# Patient Record
Sex: FEMALE | Race: WHITE | URBAN - METROPOLITAN AREA
[De-identification: names, ages, dates, MRNs, and addresses within clinical notes are randomized per-mention and may not be internally consistent; named-entity substitution may affect disease eponyms.]

---

## 2017-01-10 ENCOUNTER — TELEPHONE (OUTPATIENT)
Dept: CARDIOLOGY CLINIC | Age: 62
End: 2017-01-10

## 2017-01-13 ENCOUNTER — HOSPITAL ENCOUNTER (OUTPATIENT)
Dept: OTHER | Age: 62
Discharge: HOME | End: 2017-01-13
Attending: NURSE PRACTITIONER | Admitting: NURSE PRACTITIONER

## 2017-10-05 ENCOUNTER — OFFICE VISIT (OUTPATIENT)
Dept: CARDIOLOGY CLINIC | Age: 62
End: 2017-10-05

## 2017-10-05 VITALS
HEIGHT: 60 IN | DIASTOLIC BLOOD PRESSURE: 80 MMHG | SYSTOLIC BLOOD PRESSURE: 138 MMHG | BODY MASS INDEX: 40.44 KG/M2 | WEIGHT: 206 LBS | HEART RATE: 80 BPM

## 2017-10-05 DIAGNOSIS — I10 ESSENTIAL HYPERTENSION, BENIGN: ICD-10-CM

## 2017-10-05 DIAGNOSIS — E78.5 OTHER AND UNSPECIFIED HYPERLIPIDEMIA: ICD-10-CM

## 2017-10-05 DIAGNOSIS — I25.119 ATHEROSCLEROSIS OF NATIVE CORONARY ARTERY OF NATIVE HEART WITH ANGINA PECTORIS (HCC): Primary | ICD-10-CM

## 2017-10-05 PROCEDURE — 3017F COLORECTAL CA SCREEN DOC REV: CPT | Performed by: INTERNAL MEDICINE

## 2017-10-05 PROCEDURE — G8427 DOCREV CUR MEDS BY ELIG CLIN: HCPCS | Performed by: INTERNAL MEDICINE

## 2017-10-05 PROCEDURE — G8484 FLU IMMUNIZE NO ADMIN: HCPCS | Performed by: INTERNAL MEDICINE

## 2017-10-05 PROCEDURE — 99214 OFFICE O/P EST MOD 30 MIN: CPT | Performed by: INTERNAL MEDICINE

## 2017-10-05 PROCEDURE — G8598 ASA/ANTIPLAT THER USED: HCPCS | Performed by: INTERNAL MEDICINE

## 2017-10-05 PROCEDURE — G8417 CALC BMI ABV UP PARAM F/U: HCPCS | Performed by: INTERNAL MEDICINE

## 2017-10-05 PROCEDURE — 3014F SCREEN MAMMO DOC REV: CPT | Performed by: INTERNAL MEDICINE

## 2017-10-05 PROCEDURE — 93000 ELECTROCARDIOGRAM COMPLETE: CPT | Performed by: INTERNAL MEDICINE

## 2017-10-05 PROCEDURE — 1036F TOBACCO NON-USER: CPT | Performed by: INTERNAL MEDICINE

## 2017-10-05 RX ORDER — AMLODIPINE BESYLATE 5 MG/1
5 TABLET ORAL DAILY
COMMUNITY
End: 2017-10-25 | Stop reason: SDUPTHER

## 2017-10-05 RX ORDER — FAMOTIDINE 20 MG/1
20 TABLET, FILM COATED ORAL 2 TIMES DAILY
COMMUNITY
End: 2018-09-26

## 2017-10-05 RX ORDER — VALSARTAN 40 MG/1
40 TABLET ORAL 2 TIMES DAILY
COMMUNITY
End: 2017-10-25 | Stop reason: SDUPTHER

## 2017-10-05 NOTE — MR AVS SNAPSHOT
nitroGLYCERIN (NITROLINGUAL) 0.4 MG/SPRAY spray Place 1 spray under the tongue every 5 minutes as needed      Allergies              Codeine     Penicillins     Sulfa Antibiotics     Versed [Midazolam]     Pt states it makes her \"evil\"      We Ordered/Performed the following           EKG 12 lead unit performed     Comments: This order was created through External Result Entry    EKG 12 Lead          Additional Information        Basic Information     Date Of Birth Sex Race Ethnicity Preferred Language    1955 Female White Non-/Non  English      Problem List as of 10/5/2017  Date Reviewed: 10/5/2017                Fatigue    PVD (peripheral vascular disease) (HCC)    Chest pain    Other and unspecified hyperlipidemia    Essential hypertension, benign    Coronary atherosclerosis of native coronary artery      Preventive Care        Date Due    Hepatitis C screening is recommended for all adults regardless of risk factors born between [facility] at least once (lifetime) who have never been tested. 1955    HIV screening is recommended for all people regardless of risk factors  aged 15-65 years at least once (lifetime) who have never been HIV tested. 2/9/1971    Tetanus Combination Vaccine (1 - Tdap) 2/9/1975    Pap Smear 2/9/1977    Diabetes Screening 2/9/1996    Mammograms are recommended every 2 years for low/average risk patients aged 48 - 69, and every year for high risk patients per updated national guidelines. However these guidelines can be individualized by your provider. 2/9/2006    Colonoscopy 2/9/2006    Zoster Vaccine 2/9/2016    Yearly Flu Vaccine (1) 9/1/2017    Cholesterol Screening 8/27/2020            MyChart Signup           Our records indicate that you have declined MyChart signup.

## 2017-10-05 NOTE — LETTER
 valsartan (DIOVAN) 40 MG tablet Take 40 mg by mouth 2 times daily      furosemide (LASIX) 40 MG tablet Take 1 tablet by mouth daily (Patient taking differently: Take 40 mg by mouth every other day ) 90 tablet 3    clopidogrel (PLAVIX) 75 MG tablet Take 1 tablet by mouth daily 90 tablet 3    Budesonide-Formoterol Fumarate (SYMBICORT IN) Inhale  into the lungs.  OXYGEN 2 L by Nasal route nightly.  aspirin 81 MG EC tablet Take 81 mg by mouth daily.  nitroGLYCERIN (NITROLINGUAL) 0.4 MG/SPRAY spray Place 1 spray under the tongue every 5 minutes as needed 1 Bottle 2     No current facility-administered medications for this visit. Social History:  Social History     Social History    Marital status:      Spouse name: N/A    Number of children: N/A    Years of education: N/A     Occupational History    Not on file. Social History Main Topics    Smoking status: Former Smoker     Packs/day: 0.25     Years: 20.00    Smokeless tobacco: Never Used    Alcohol use No    Drug use: No    Sexual activity: Not on file     Other Topics Concern    Not on file     Social History Narrative       Family History:  family history includes Coronary Art Dis in her father; Heart Disease in her father and mother; Devoria Yovanny in her mother. Allergies:  Codeine; Penicillins; Sulfa antibiotics; and Versed [midazolam]     Review of Systems:   · Constitutional: there has been no unanticipated weight loss. No change in energy or activity level   · Eyes: No visual changes   · ENT: No Headaches, hearing loss or vertigo. No mouth sores or sore throat. · Cardiovascular: Reviewed in HPI  · Respiratory: No cough or wheezing, no sputum production. · Gastrointestinal: No abdominal pain, appetite loss, blood in stools. No change in bowel or bladder habits. · Genitourinary: No nocturia, dysuria, trouble voiding  · Musculoskeletal:  No gait disturbance, weakness or joint complaints. after the visit showed that she had 2 caths in [place], the first with a stent placed in the LAD and a repeat cath 3 weeks later that showed the stent was patent. Review of her previous records show that she has had about 15 cath procedures and has several stents. RCA, OM LAD   2. Essential hypertension, benign -stable   3. Other and unspecified hyperlipidemia -intolerant to lipitor, Crestor, simvastatin. Was on repatha but funds ran out   4. Tobacco use -discussed at length. States she will buy patches today  5. Peripheral vascular disease - cant take statins and continues to smoke. Sp Aortobifem. 2 week FU. Hopefully she will stop smoking soon. Possibly she will qualify for one of the new lipid lowering agents. Thank you for allowing to me to participate in the care of Michael Gracia. If you have questions, please do not hesitate to call me. I look forward to following [facility] along with you.     Sincerely,        Amish Abdi MD

## 2017-10-05 NOTE — PROGRESS NOTES
tongue every 5 minutes as needed 1 Bottle 2     No current facility-administered medications for this visit. Social History:  Social History     Social History    Marital status:      Spouse name: N/A    Number of children: N/A    Years of education: N/A     Occupational History    Not on file. Social History Main Topics    Smoking status: Former Smoker     Packs/day: 0.25     Years: 20.00    Smokeless tobacco: Never Used    Alcohol use No    Drug use: No    Sexual activity: Not on file     Other Topics Concern    Not on file     Social History Narrative       Family History:  family history includes Coronary Art Dis in her father; Heart Disease in her father and mother; Daja Acosta in her mother. Allergies:  Codeine; Penicillins; Sulfa antibiotics; and Versed [midazolam]     Review of Systems:   · Constitutional: there has been no unanticipated weight loss. No change in energy or activity level   · Eyes: No visual changes   · ENT: No Headaches, hearing loss or vertigo. No mouth sores or sore throat. · Cardiovascular: Reviewed in HPI  · Respiratory: No cough or wheezing, no sputum production. · Gastrointestinal: No abdominal pain, appetite loss, blood in stools. No change in bowel or bladder habits. · Genitourinary: No nocturia, dysuria, trouble voiding  · Musculoskeletal:  No gait disturbance, weakness or joint complaints. · Integumentary: No rash or pruritis. · Neurological: No headache, change in muscle strength, numbness or tingling. No change in gait, balance, coordination, mood, affect, memory, mentation, behavior. · Psychiatric: No anxiety or depression  · Endocrine: No malaise or fever  · Hematologic/Lymphatic: No abnormal bruising or bleeding, blood clots or swollen lymph nodes. · Allergic/Immunologic: No nasal congestion or hives.     Physical Examination:    Vitals:    10/05/17 1208   BP: 138/80   Site: Right Arm   Position: Sitting   Cuff Size: Large Adult   Pulse: 80   Weight: 206 lb (93.4 kg)   Height: 5' (1.524 m)     Body mass index is 40.23 kg/(m^2). Wt Readings from Last 3 Encounters:   10/05/17 206 lb (93.4 kg)   11/16/16 201 lb (91.2 kg)   11/18/15 192 lb (87.1 kg)      BP Readings from Last 3 Encounters:   10/05/17 138/80   11/16/16 (!) 160/92   11/18/15 130/80      Constitutional and General Appearance:  appears stated age  Eyes - no xanthelasma  Respiratory:  · Normal excursion and expansion without use of accessory muscles  · Resp Auscultation: Normal breath sounds without dullness  Cardiovascular:  · The apical impulses not displaced  · Heart is regular rate and rhythm with normal S1, S2, 2/6 REJI  · PMI is normal  · The carotid upstroke is normal, no bruit noted   · JVP is not elevated  · Peripheral pulses are symmetrical  · There is no clubbing, cyanosis of the extremities  · No edema  · No varicosities  · Femoral Arteries: 2+ and equal without bruits  · Pedal Pulses: 2+ and equal   Abdomen:  · No masses or tenderness  · Aorta not palpable  · Normal bowel sounds  Neurological/Psychiatric:  · Alert and oriented x3  · Moves all extremities well  · Exhibits normal gait balance and coordination      Assessment/Plan  1. Atherosclerosis of native coronary artery of native heart with angina pectoris ([facility] [place] [facility] [address] -recent stent placed in [place]. Records obtained after the visit showed that she had 2 caths in [place], the first with a stent placed in the LAD and a repeat cath 3 weeks later that showed the stent was patent. Review of her previous records show that she has had about 15 cath procedures and has several stents. RCA, OM LAD   2. Essential hypertension, benign -stable   3. Other and unspecified hyperlipidemia -intolerant to lipitor, Crestor, simvastatin. Was on repatha but funds ran out   4. Tobacco use -discussed at length. States she will buy patches today  5. Peripheral vascular disease - cant take statins and continues to smoke. Sp Aortobifem. 2 week FU. Hopefully she will stop smoking soon. Possibly she will qualify for one of the new lipid lowering agents. Thank you for allowing to me to participate in the care of Suzie Palma.

## 2017-10-25 ENCOUNTER — OFFICE VISIT (OUTPATIENT)
Dept: CARDIOLOGY CLINIC | Age: 62
End: 2017-10-25

## 2017-10-25 VITALS
HEIGHT: 60 IN | HEART RATE: 66 BPM | DIASTOLIC BLOOD PRESSURE: 100 MMHG | BODY MASS INDEX: 40.84 KG/M2 | SYSTOLIC BLOOD PRESSURE: 140 MMHG | WEIGHT: 208 LBS

## 2017-10-25 DIAGNOSIS — I10 ESSENTIAL HYPERTENSION, BENIGN: ICD-10-CM

## 2017-10-25 DIAGNOSIS — E78.2 MIXED HYPERLIPIDEMIA: ICD-10-CM

## 2017-10-25 DIAGNOSIS — I25.119 ATHEROSCLEROSIS OF NATIVE CORONARY ARTERY OF NATIVE HEART WITH ANGINA PECTORIS (HCC): Primary | ICD-10-CM

## 2017-10-25 PROCEDURE — G8427 DOCREV CUR MEDS BY ELIG CLIN: HCPCS | Performed by: NURSE PRACTITIONER

## 2017-10-25 PROCEDURE — G8598 ASA/ANTIPLAT THER USED: HCPCS | Performed by: NURSE PRACTITIONER

## 2017-10-25 PROCEDURE — G8484 FLU IMMUNIZE NO ADMIN: HCPCS | Performed by: NURSE PRACTITIONER

## 2017-10-25 PROCEDURE — 3017F COLORECTAL CA SCREEN DOC REV: CPT | Performed by: NURSE PRACTITIONER

## 2017-10-25 PROCEDURE — 3014F SCREEN MAMMO DOC REV: CPT | Performed by: NURSE PRACTITIONER

## 2017-10-25 PROCEDURE — 99214 OFFICE O/P EST MOD 30 MIN: CPT | Performed by: NURSE PRACTITIONER

## 2017-10-25 PROCEDURE — G8417 CALC BMI ABV UP PARAM F/U: HCPCS | Performed by: NURSE PRACTITIONER

## 2017-10-25 PROCEDURE — 1036F TOBACCO NON-USER: CPT | Performed by: NURSE PRACTITIONER

## 2017-10-25 RX ORDER — AMLODIPINE BESYLATE 5 MG/1
5 TABLET ORAL DAILY
Qty: 30 TABLET | Refills: 5 | Status: SHIPPED | OUTPATIENT
Start: 2017-10-25 | End: 2018-09-26

## 2017-10-25 RX ORDER — VALSARTAN 40 MG/1
40 TABLET ORAL 2 TIMES DAILY
Qty: 60 TABLET | Refills: 6 | Status: SHIPPED | OUTPATIENT
Start: 2017-10-25 | End: 2018-09-26

## 2017-10-25 RX ORDER — DOXAZOSIN 2 MG/1
2 TABLET ORAL NIGHTLY
Qty: 30 TABLET | Refills: 3 | Status: SHIPPED | OUTPATIENT
Start: 2017-10-25 | End: 2019-11-06 | Stop reason: ALTCHOICE

## 2017-10-25 RX ORDER — CLOPIDOGREL BISULFATE 75 MG/1
75 TABLET ORAL DAILY
Qty: 90 TABLET | Refills: 3 | Status: SHIPPED | OUTPATIENT
Start: 2017-10-25

## 2017-10-25 RX ORDER — FUROSEMIDE 40 MG/1
40 TABLET ORAL DAILY
Qty: 90 TABLET | Refills: 3 | Status: SHIPPED | OUTPATIENT
Start: 2017-10-25 | End: 2018-08-20 | Stop reason: DRUGHIGH

## 2017-10-25 NOTE — LETTER
 Number of children: N/A    Years of education: N/A     Occupational History    Not on file. Social History Main Topics    Smoking status: Former Smoker     Packs/day: 0.25     Years: 20.00    Smokeless tobacco: Never Used    Alcohol use No    Drug use: No    Sexual activity: Not on file     Other Topics Concern    Not on file     Social History Narrative    No narrative on file       Family History:  family history includes Coronary Art Dis in her father; Heart Disease in her father and mother; Sheldon Standing in her mother. Allergies:  Codeine; Penicillins; Sulfa antibiotics; and Versed [midazolam]     Review of Systems:   · Constitutional: there has been no unanticipated weight loss. · Eyes: No visual changes   · ENT: No Headaches, hearing loss or vertigo. No mouth sores or sore throat. · Cardiovascular: Reviewed in HPI  · Respiratory: No cough or wheezing, no sputum production. · Gastrointestinal: No abdominal pain, appetite loss, blood in stools. No change in bowel or bladder habits. · Genitourinary: No nocturia, dysuria, trouble voiding  · Musculoskeletal:  No gait disturbance, weakness or joint complaints. · Integumentary: No rash or pruritis. · Neurological: No headache, change in muscle strength, numbness or tingling. No change in gait, balance, coordination, mood, affect, memory, mentation, behavior. · Psychiatric: No anxiety or depression  · Endocrine: No malaise or fever  · Hematologic/Lymphatic: No abnormal bruising or bleeding, blood clots or swollen lymph nodes. · Allergic/Immunologic: No nasal congestion or hives. Physical Examination:    Vitals:    10/25/17 1049 10/25/17 1053   BP: (!) 140/100 (!) 140/100   Site: Left Arm    Position: Sitting    Cuff Size: Large Adult    Pulse: 66    Weight: 208 lb (94.3 kg)    Height: 5' (1.524 m)      Body mass index is 40.62 kg/m².      Wt Readings from Last 3 Encounters:   10/25/17 208 lb (94.3 kg)   10/05/17 206 lb (93.4 kg) 11/16/16 201 lb (91.2 kg)      BP Readings from Last 3 Encounters:   10/25/17 (!) 140/100   10/05/17 138/80   11/16/16 (!) 160/92      Constitutional and General Appearance:  appears stated age, has xanthomas, and xanthelasmas  Respiratory:  · Normal excursion and expansion without use of accessory muscles  · Resp Auscultation: Normal breath sounds without dullness  Cardiovascular:  · The apical impulses not displaced  · Heart is regular rate and rhythm with normal S1, S2, 1/6 soft murmur  · The carotid upstroke is normal, + bruit noted   · JVP is not elevated  · Peripheral pulses are symmetrical  · There is no clubbing, cyanosis of the extremities  · No edema  · Femoral Arteries: 2+ and equal  · Pedal Pulses: 2+ and equal   Abdomen:  · No masses or tenderness  · Normal bowel sounds  Neurological/Psychiatric:  · Alert and oriented x3  · Moves all extremities well  · Exhibits normal gait balance and coordination      Assessment/Plan  1. 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 Essential hypertension -   Previous RA stent 2003 by Dr. Jacek Roe at [facility]. only taking hydralazine once a day, needs to increase to BID  Renal arteries - patent stent in the left RA; 50% stenosis at the origin of the right renal artery. Plan: she can not tolerate bigger dose of clonidine  Summary 5/15      Grayscale imaging reveals normal-sized renal parenchyma and no significant   flow disturbance in the bilateral renal arteries. No hemodynamically significant stenosis in the visualized portion of the   renal arteries. Renal artery velocity, renal aortic ratio and resistive indices are noted to   be normal bilaterally.         A lot of her  meds were stopped while she was in the hospital

## 2017-10-25 NOTE — COMMUNICATION BODY
[address]   Cardiac Evaluation      Patient: Aniyah Salvador  YOB: 1955  Date: 10/25/17       Chief Complaint   Patient presents with    Coronary Artery Disease     Ocas fatigue    Hypertension    Hyperlipidemia        Referring provider: Zee Thompson MD    History of Present Illness:  Ms Namita Chiang is seen today for  follow up. She has hx of elevated lipids,  HTN, CAD. She moved to [place] for few months ago  and had stent in LAD then. After abnormal GXT, and also found to have diffuse small vessel disease. She also has PVD. Ifeanyi Doyle had a stent placed to the RCA and OM in 2003. Then, August, 2009 she had a repeat cath which revealed patent stents. She is taking multiple blood pressure medications. She has a left renal artery stent in 2003 with Dr Roslyn Anna. 3/14 had angiogram-no intervention. She has been adjusting her medications  She also has increased stress with her  that is ill. SHe was on juxtapid for awhile, but insurance won't pay for it and now on repatha and giving herself injections without any issues. She continues to smoke. Past Medical History:   has a past medical history of CAD (coronary artery disease); Hyperlipidemia; Hypertension; and PVD (peripheral vascular disease) ([address] Surgical History:   has a past surgical history that includes Coronary artery bypass graft; Hysterectomy; Abdominal aortic aneurysm repair (2007); and Cardiac catheterization. Social History:  Social History     Social History    Marital status:      Spouse name: N/A    Number of children: N/A    Years of education: N/A     Occupational History    Not on file.      Social History Main Topics    Smoking status: Former Smoker     Packs/day: 0.25     Years: 20.00    Smokeless tobacco: Never Used    Alcohol use No    Drug use: No    Sexual activity: Not on file     Other Topics Concern    Not on file     Social History Narrative    No narrative on file       Family History:  family history includes Coronary Art Dis in her father; Heart Disease in her father and mother; Reginold Kerr in her mother. Allergies:  Codeine; Penicillins; Sulfa antibiotics; and Versed [midazolam]     Review of Systems:   · Constitutional: there has been no unanticipated weight loss. · Eyes: No visual changes   · ENT: No Headaches, hearing loss or vertigo. No mouth sores or sore throat. · Cardiovascular: Reviewed in HPI  · Respiratory: No cough or wheezing, no sputum production. · Gastrointestinal: No abdominal pain, appetite loss, blood in stools. No change in bowel or bladder habits. · Genitourinary: No nocturia, dysuria, trouble voiding  · Musculoskeletal:  No gait disturbance, weakness or joint complaints. · Integumentary: No rash or pruritis. · Neurological: No headache, change in muscle strength, numbness or tingling. No change in gait, balance, coordination, mood, affect, memory, mentation, behavior. · Psychiatric: No anxiety or depression  · Endocrine: No malaise or fever  · Hematologic/Lymphatic: No abnormal bruising or bleeding, blood clots or swollen lymph nodes. · Allergic/Immunologic: No nasal congestion or hives. Physical Examination:    Vitals:    10/25/17 1049 10/25/17 1053   BP: (!) 140/100 (!) 140/100   Site: Left Arm    Position: Sitting    Cuff Size: Large Adult    Pulse: 66    Weight: 208 lb (94.3 kg)    Height: 5' (1.524 m)      Body mass index is 40.62 kg/m².      Wt Readings from Last 3 Encounters:   10/25/17 208 lb (94.3 kg)   10/05/17 206 lb (93.4 kg)   11/16/16 201 lb (91.2 kg)      BP Readings from Last 3 Encounters:   10/25/17 (!) 140/100   10/05/17 138/80   11/16/16 (!) 160/92      Constitutional and General Appearance:  appears stated age, has xanthomas, and xanthelasmas  Respiratory:  · Normal excursion and expansion without use of accessory muscles  · Resp Auscultation: Normal breath sounds without dullness  Cardiovascular:  · The apical impulses not displaced  · Heart is regular rate and rhythm with normal S1, S2, 1/6 soft murmur  · The carotid upstroke is normal, + bruit noted   · JVP is not elevated  · Peripheral pulses are symmetrical  · There is no clubbing, cyanosis of the extremities  · No edema  · Femoral Arteries: 2+ and equal  · Pedal Pulses: 2+ and equal   Abdomen:  · No masses or tenderness  · Normal bowel sounds  Neurological/Psychiatric:  · Alert and oriented x3  · Moves all extremities well  · Exhibits normal gait balance and coordination      Assessment/Plan  1. 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 Essential hypertension -   Previous RA stent 2003 by Dr. Christa Thomas at [facility]. only taking hydralazine once a day, needs to increase to BID  Renal arteries - patent stent in the left RA; 50% stenosis at the origin of the right renal artery. Plan: she can not tolerate bigger dose of clonidine  Summary 5/15      Grayscale imaging reveals normal-sized renal parenchyma and no significant   flow disturbance in the bilateral renal arteries. No hemodynamically significant stenosis in the visualized portion of the   renal arteries. Renal artery velocity, renal aortic ratio and resistive indices are noted to   be normal bilaterally. A lot of her  meds were stopped while she was in the hospital  Due to hypotension, BP at home has been in the 160s,  Cardura works for her and will restart at 1 mg a day     3.  Coronary atherosclerosis of native coronary artery -stable  PCI to RCA 7/17 after abnormal GXT  GXT 5/26/11: normal myocardial perfusion w/ EF 66%   Angiogram 4/3/08: normal LVEDP at 12mmHg, EF 55%, minimal coronary disease with 2 patent stents, one of which was in the RCA and the second in OM branch One is a

## 2017-10-25 NOTE — COMMUNICATION BODY
tongue every 5 minutes as needed 1 Bottle 2     No current facility-administered medications for this visit. Social History:  Social History     Social History    Marital status:      Spouse name: N/A    Number of children: N/A    Years of education: N/A     Occupational History    Not on file. Social History Main Topics    Smoking status: Former Smoker     Packs/day: 0.25     Years: 20.00    Smokeless tobacco: Never Used    Alcohol use No    Drug use: No    Sexual activity: Not on file     Other Topics Concern    Not on file     Social History Narrative       Family History:  family history includes Coronary Art Dis in her father; Heart Disease in her father and mother; Linharika Mendez in her mother. Allergies:  Codeine; Penicillins; Sulfa antibiotics; and Versed [midazolam]     Review of Systems:   · Constitutional: there has been no unanticipated weight loss. No change in energy or activity level   · Eyes: No visual changes   · ENT: No Headaches, hearing loss or vertigo. No mouth sores or sore throat. · Cardiovascular: Reviewed in HPI  · Respiratory: No cough or wheezing, no sputum production. · Gastrointestinal: No abdominal pain, appetite loss, blood in stools. No change in bowel or bladder habits. · Genitourinary: No nocturia, dysuria, trouble voiding  · Musculoskeletal:  No gait disturbance, weakness or joint complaints. · Integumentary: No rash or pruritis. · Neurological: No headache, change in muscle strength, numbness or tingling. No change in gait, balance, coordination, mood, affect, memory, mentation, behavior. · Psychiatric: No anxiety or depression  · Endocrine: No malaise or fever  · Hematologic/Lymphatic: No abnormal bruising or bleeding, blood clots or swollen lymph nodes. · Allergic/Immunologic: No nasal congestion or hives.     Physical Examination:    Vitals:    10/05/17 1208   BP: 138/80   Site: Right Arm   Position: Sitting   Cuff Size: Large Adult   Pulse: 80   Weight: 206 lb (93.4 kg)   Height: 5' (1.524 m)     Body mass index is 40.23 kg/(m^2). Wt Readings from Last 3 Encounters:   10/05/17 206 lb (93.4 kg)   11/16/16 201 lb (91.2 kg)   11/18/15 192 lb (87.1 kg)      BP Readings from Last 3 Encounters:   10/05/17 138/80   11/16/16 (!) 160/92   11/18/15 130/80      Constitutional and General Appearance:  appears stated age  Eyes - no xanthelasma  Respiratory:  · Normal excursion and expansion without use of accessory muscles  · Resp Auscultation: Normal breath sounds without dullness  Cardiovascular:  · The apical impulses not displaced  · Heart is regular rate and rhythm with normal S1, S2, 2/6 REJI  · PMI is normal  · The carotid upstroke is normal, no bruit noted   · JVP is not elevated  · Peripheral pulses are symmetrical  · There is no clubbing, cyanosis of the extremities  · No edema  · No varicosities  · Femoral Arteries: 2+ and equal without bruits  · Pedal Pulses: 2+ and equal   Abdomen:  · No masses or tenderness  · Aorta not palpable  · Normal bowel sounds  Neurological/Psychiatric:  · Alert and oriented x3  · Moves all extremities well  · Exhibits normal gait balance and coordination      Assessment/Plan  1. Atherosclerosis of native coronary artery of native heart with angina pectoris ([facility] [address] -recent stent placed in [place]. Records obtained after the visit showed that she had 2 caths in [place], the first with a stent placed in the LAD and a repeat cath 3 weeks later that showed the stent was patent. Review of her previous records show that she has had about 15 cath procedures and has several stents. RCA, OM LAD   2. Essential hypertension, benign -stable   3. Other and unspecified hyperlipidemia -intolerant to lipitor, Crestor, simvastatin. Was on repatha but funds ran out   4. Tobacco use -discussed at length. States she will buy patches today  5. Peripheral vascular disease - cant take statins and continues to smoke. Sp Aortobifem. 2 week FU. Hopefully she will stop smoking soon. Possibly she will qualify for one of the new lipid lowering agents. Thank you for allowing to me to participate in the care of Aniyah Salvador.

## 2017-10-25 NOTE — PROGRESS NOTES
[address]   Cardiac Evaluation      Patient: China Hickman  YOB: 1955  Date: 10/25/17       Chief Complaint   Patient presents with    Coronary Artery Disease     Ocas fatigue    Hypertension    Hyperlipidemia        Referring provider: Aryan Vital MD    History of Present Illness:  Ms Gogo Chacko is seen today for  follow up. She has hx of elevated lipids,  HTN, CAD. She moved to [place] for few months ago  and had stent in LAD then. After abnormal GXT, and also found to have diffuse small vessel disease. She also has PVD. David Cedillo had a stent placed to the RCA and OM in 2003. Then, August, 2009 she had a repeat cath which revealed patent stents. She is taking multiple blood pressure medications. She has a left renal artery stent in 2003 with Dr Bradley Truong. 3/14 had angiogram-no intervention. She has been adjusting her medications  She also has increased stress with her  that is ill. SHe was on juxtapid for awhile, but insurance won't pay for it and now on repatha and giving herself injections without any issues. She continues to smoke. Past Medical History:   has a past medical history of CAD (coronary artery disease); Hyperlipidemia; Hypertension; and PVD (peripheral vascular disease) ([address] Surgical History:   has a past surgical history that includes Coronary artery bypass graft; Hysterectomy; Abdominal aortic aneurysm repair (2007); and Cardiac catheterization. Social History:  Social History     Social History    Marital status:      Spouse name: N/A    Number of children: N/A    Years of education: N/A     Occupational History    Not on file.      Social History Main Topics    Smoking status: Former Smoker     Packs/day: 0.25     Years: 20.00    Smokeless tobacco: Never Used    Alcohol use No    Drug use: No    Sexual activity: Not on file     Other Topics Concern    Not on file     Social History Narrative    No narrative on impulses not displaced  · Heart is regular rate and rhythm with normal S1, S2, 1/6 soft murmur  · The carotid upstroke is normal, + bruit noted   · JVP is not elevated  · Peripheral pulses are symmetrical  · There is no clubbing, cyanosis of the extremities  · No edema  · Femoral Arteries: 2+ and equal  · Pedal Pulses: 2+ and equal   Abdomen:  · No masses or tenderness  · Normal bowel sounds  Neurological/Psychiatric:  · Alert and oriented x3  · Moves all extremities well  · Exhibits normal gait balance and coordination      Assessment/Plan  1. 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 Essential hypertension -   Previous RA stent 2003 by Dr. Fco Vazquez at [facility]. only taking hydralazine once a day, needs to increase to BID  Renal arteries - patent stent in the left RA; 50% stenosis at the origin of the right renal artery. Plan: she can not tolerate bigger dose of clonidine  Summary 5/15      Grayscale imaging reveals normal-sized renal parenchyma and no significant   flow disturbance in the bilateral renal arteries. No hemodynamically significant stenosis in the visualized portion of the   renal arteries. Renal artery velocity, renal aortic ratio and resistive indices are noted to   be normal bilaterally. A lot of her  meds were stopped while she was in the hospital  Due to hypotension, BP at home has been in the 160s,  Cardura works for her and will restart at 1 mg a day     3.  Coronary atherosclerosis of native coronary artery -stable  PCI to RCA 7/17 after abnormal GXT  GXT 5/26/11: normal myocardial perfusion w/ EF 66%   Angiogram 4/3/08: normal LVEDP at 12mmHg, EF 55%, minimal coronary disease with 2 patent stents, one of which was in the RCA and the second in OM branch One is a

## 2018-02-28 ENCOUNTER — OFFICE VISIT (OUTPATIENT)
Dept: CARDIOLOGY CLINIC | Age: 63
End: 2018-02-28

## 2018-02-28 VITALS
DIASTOLIC BLOOD PRESSURE: 90 MMHG | WEIGHT: 214 LBS | BODY MASS INDEX: 42.01 KG/M2 | SYSTOLIC BLOOD PRESSURE: 140 MMHG | HEART RATE: 80 BPM | HEIGHT: 60 IN

## 2018-02-28 DIAGNOSIS — I10 ESSENTIAL HYPERTENSION, BENIGN: ICD-10-CM

## 2018-02-28 DIAGNOSIS — I25.119 ATHEROSCLEROSIS OF NATIVE CORONARY ARTERY OF NATIVE HEART WITH ANGINA PECTORIS (HCC): ICD-10-CM

## 2018-02-28 DIAGNOSIS — R07.9 CHEST PAIN, UNSPECIFIED TYPE: Primary | ICD-10-CM

## 2018-02-28 PROCEDURE — 99214 OFFICE O/P EST MOD 30 MIN: CPT | Performed by: NURSE PRACTITIONER

## 2018-02-28 PROCEDURE — 93000 ELECTROCARDIOGRAM COMPLETE: CPT | Performed by: NURSE PRACTITIONER

## 2018-02-28 RX ORDER — NEBIVOLOL 5 MG/1
5 TABLET ORAL DAILY
Qty: 30 TABLET | Refills: 3
Start: 2018-02-28 | End: 2018-08-20 | Stop reason: DRUGHIGH

## 2018-02-28 NOTE — LETTER
[address] Cardiology [facility]  [address] [facility] [address]  Phone: [phone]  Fax: [phone]    Chava Ding NP        March 14, 2018     [address]  [place] [address]    Patient: Jony Maldonado  MR Number: [id number]  YOB: 1955  Date of Visit: 2/28/2018    Dear Dr. Tim Solares:            [facility]   Cardiac Evaluation      Patient: Jony Maldonado  YOB: 1955  Date: 2/28/18       Chief Complaint   Patient presents with    Coronary Artery Disease     C/o high bp x 2 weeks  Cp sunday walking around house lasted 5 minutes relief with nitro x 1    Hypertension     bp 148/71,p 98 248/110 p104 162/88( after BB) p81 175/87 p82  159/70 p94 160/86 p 82 158/71 p91 bp 201/94 p96 165/78 p86 166/95 p 88 162/76 p84    Hyperlipidemia        Referring provider: Tim Solares MD    History of Present Illness:  Ms Marlo Pak is seen today for  follow up. She has hx of elevated lipids,  HTN, CAD. She moved to [place] for few months ago  and had stent in LAD then. After abnormal GXT, and also found to have diffuse small vessel disease. She also has PVD. Morris Mckenna had a stent placed to the RCA and OM in 2003. Then, August, 2009 she had a repeat cath which revealed patent stents. She is taking multiple blood pressure medications. She has a left renal artery stent in 2003 with Dr Isauro Zarco. SHe was on juxtapid for awhile, but insurance won't pay for it and now on repatha but the funds have run out and not interested in anymore. She continues to smoke 3 cigs a day. .     Past Medical History:   has a past medical history of CAD (coronary artery disease); Hyperlipidemia; Hypertension; and PVD (peripheral vascular disease) ([facility] [address] Surgical History:   has a past surgical history that includes Coronary artery bypass graft; Hysterectomy; Abdominal aortic aneurysm repair (2007); and Cardiac catheterization. Wt Readings from Last 3 Encounters:   02/28/18 214 lb (97.1 kg)   10/25/17 208 lb (94.3 kg)   10/05/17 206 lb (93.4 kg)      BP Readings from Last 3 Encounters:   02/28/18 (!) 140/90   10/25/17 (!) 140/100   10/05/17 138/80      Constitutional and General Appearance:  appears stated age, has xanthomas, and xanthelasmas  Respiratory:  · Normal excursion and expansion without use of accessory muscles  · Resp Auscultation: Normal breath sounds without dullness  Cardiovascular:  · The apical impulses not displaced  · Heart is regular rate and rhythm with normal S1, S2, 1/6 soft murmur,  EKG today reviewed by me no changes  · The carotid upstroke is normal, + bruit noted   · JVP is not elevated  · Peripheral pulses are symmetrical  · There is no clubbing, cyanosis of the extremities  · No edema  · Femoral Arteries: 2+ and equal  · Pedal Pulses: 2+ and equal   Abdomen:  · No masses or tenderness  · Normal bowel sounds  Neurological/Psychiatric:  · Alert and oriented x3  · Moves all extremities well  · Exhibits normal gait balance and coordination      Assessment/Plan  1. 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 Essential hypertension -   Previous RA stent 2003 by Dr. Mary Miranda at [facility]. only taking hydralazine once a day, needs to increase to BID  Renal arteries - patent stent in the left RA; 50% stenosis at the origin of the right renal artery. Plan: she can not tolerate bigger dose of clonidine  Summary 5/15      Grayscale imaging reveals normal-sized renal parenchyma and no significant   flow disturbance in the bilateral renal arteries. No hemodynamically significant stenosis in the visualized portion of the   renal arteries. Renal artery velocity, renal aortic ratio and resistive indices are noted to   be normal bilaterally.

## 2018-02-28 NOTE — PROGRESS NOTES
[address]   Cardiac Evaluation      Patient: Ted Cevallos  YOB: 1955  Date: 2/28/18       Chief Complaint   Patient presents with    Coronary Artery Disease     C/o high bp x 2 weeks  Cp sunday walking around house lasted 5 minutes relief with nitro x 1    Hypertension     bp 148/71,p 98 248/110 p104 162/88( after BB) p81 175/87 p82  159/70 p94 160/86 p 82 158/71 p91 bp 201/94 p96 165/78 p86 166/95 p 88 162/76 p84    Hyperlipidemia        Referring provider: Janett Soto MD    History of Present Illness:  Ms Nadiya Tiwari is seen today for  follow up. She has hx of elevated lipids,  HTN, CAD. She moved to [place] for few months ago  and had stent in LAD then. After abnormal GXT, and also found to have diffuse small vessel disease. She also has PVD. Gal Andino had a stent placed to the RCA and OM in 2003. Then, August, 2009 she had a repeat cath which revealed patent stents. She is taking multiple blood pressure medications. She has a left renal artery stent in 2003 with Dr Maris Curiel. SHe was on juxtapid for awhile, but insurance won't pay for it and now on repatha but the funds have run out and not interested in anymore. She continues to smoke 3 cigs a day. .     Past Medical History:   has a past medical history of CAD (coronary artery disease); Hyperlipidemia; Hypertension; and PVD (peripheral vascular disease) ([address] Surgical History:   has a past surgical history that includes Coronary artery bypass graft; Hysterectomy; Abdominal aortic aneurysm repair (2007); and Cardiac catheterization. Social History:  Social History     Social History    Marital status:      Spouse name: N/A    Number of children: N/A    Years of education: N/A     Occupational History    Not on file.      Social History Main Topics    Smoking status: Former Smoker     Packs/day: 0.25     Years: 20.00    Smokeless tobacco: Never Used    Alcohol use No    Drug use: No    Sexual

## 2018-03-14 ENCOUNTER — OFFICE VISIT (OUTPATIENT)
Dept: CARDIOLOGY CLINIC | Age: 63
End: 2018-03-14

## 2018-03-14 VITALS
HEIGHT: 60 IN | DIASTOLIC BLOOD PRESSURE: 90 MMHG | SYSTOLIC BLOOD PRESSURE: 152 MMHG | WEIGHT: 217 LBS | BODY MASS INDEX: 42.6 KG/M2 | HEART RATE: 70 BPM

## 2018-03-14 DIAGNOSIS — I10 ESSENTIAL HYPERTENSION, BENIGN: ICD-10-CM

## 2018-03-14 DIAGNOSIS — E78.2 MIXED HYPERLIPIDEMIA: Primary | ICD-10-CM

## 2018-03-14 DIAGNOSIS — I25.119 ATHEROSCLEROSIS OF NATIVE CORONARY ARTERY OF NATIVE HEART WITH ANGINA PECTORIS (HCC): ICD-10-CM

## 2018-03-14 PROCEDURE — 99214 OFFICE O/P EST MOD 30 MIN: CPT | Performed by: NURSE PRACTITIONER

## 2018-03-14 NOTE — COMMUNICATION BODY
activity: Not on file     Other Topics Concern    Not on file     Social History Narrative    No narrative on file       Family History:  family history includes Coronary Art Dis in her father; Heart Disease in her father and mother; Angela Fiddler in her mother. Allergies:  Codeine; Penicillins; Sulfa antibiotics; and Versed [midazolam]     Review of Systems:   · Constitutional: there has been no unanticipated weight loss. · Eyes: No visual changes   · ENT: No Headaches, hearing loss or vertigo. No mouth sores or sore throat. · Cardiovascular: Reviewed in HPI  · Respiratory: No cough or wheezing, no sputum production. · Gastrointestinal: No abdominal pain, appetite loss, blood in stools. No change in bowel or bladder habits. · Genitourinary: No nocturia, dysuria, trouble voiding  · Musculoskeletal:  No gait disturbance, weakness or joint complaints. · Integumentary: No rash or pruritis. · Neurological: No headache, change in muscle strength, numbness or tingling. No change in gait, balance, coordination, mood, affect, memory, mentation, behavior. · Psychiatric: No anxiety or depression  · Endocrine: No malaise or fever  · Hematologic/Lymphatic: No abnormal bruising or bleeding, blood clots or swollen lymph nodes. · Allergic/Immunologic: No nasal congestion or hives. Physical Examination:    Vitals:    02/28/18 1123 02/28/18 1130   BP: (!) 140/90 (!) 140/90   Site: Left Arm    Position: Sitting    Cuff Size: Large Adult    Pulse: 80    Weight: 214 lb (97.1 kg)    Height: 5' (1.524 m)      Body mass index is 41.79 kg/m².      Wt Readings from Last 3 Encounters:   02/28/18 214 lb (97.1 kg)   10/25/17 208 lb (94.3 kg)   10/05/17 206 lb (93.4 kg)      BP Readings from Last 3 Encounters:   02/28/18 (!) 140/90   10/25/17 (!) 140/100   10/05/17 138/80      Constitutional and General Appearance:  appears stated age, has xanthomas, and xanthelasmas  Respiratory:  · Normal excursion and expansion without use of accessory muscles  · Resp Auscultation: Normal breath sounds without dullness  Cardiovascular:  · The apical impulses not displaced  · Heart is regular rate and rhythm with normal S1, S2, 1/6 soft murmur,  EKG today reviewed by me no changes  · The carotid upstroke is normal, + bruit noted   · JVP is not elevated  · Peripheral pulses are symmetrical  · There is no clubbing, cyanosis of the extremities  · No edema  · Femoral Arteries: 2+ and equal  · Pedal Pulses: 2+ and equal   Abdomen:  · No masses or tenderness  · Normal bowel sounds  Neurological/Psychiatric:  · Alert and oriented x3  · Moves all extremities well  · Exhibits normal gait balance and coordination      Assessment/Plan  1. 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 Essential hypertension -   Previous RA stent 2003 by Dr. Ray Long at [facility]. only taking hydralazine once a day, needs to increase to BID  Renal arteries - patent stent in the left RA; 50% stenosis at the origin of the right renal artery. Plan: she can not tolerate bigger dose of clonidine  Summary 5/15      Grayscale imaging reveals normal-sized renal parenchyma and no significant   flow disturbance in the bilateral renal arteries. No hemodynamically significant stenosis in the visualized portion of the   renal arteries. Renal artery velocity, renal aortic ratio and resistive indices are noted to   be normal bilaterally. 3. Coronary atherosclerosis of native coronary artery -  CATH 8/17 in [facility]-no intervention, small vessel disease, patent stents  PCI to RCA 7/17 after abnormal GXT  GXT 5/26/11: normal myocardial perfusion w/ EF 66%   Angiogram 4/3/08: normal LVEDP at 12mmHg, EF 55%, minimal coronary disease with 2 patent stents, one of which was in the RCA and the second in OM branch One is a CONNOR stent placed by Dr Polo Linares in 2001.  LVEDP - 16  3/14  LVgram - mild inferior hypokinesis with EF 55%  Cors patent stents in OM and RCA with  20% instent restenosis in both. Plan: continue medical therapy      4. Short of breath -remains the same   5. Chest pain-using NTG few times a week  ? Try IMdur but does get HA with NTG  Summary 5/15   Normal myocardial perfusion study. Normal LV function         6. AAA 2006-  9/14   Tech Comments   Right   The right iliac artery lumen appears patent with no evidence of aneurysmal   changes. Left   The left iliac artery lumen appears patent with no evidence of aneurysmal   changes. UnilateralThe aorta internal lumen appears patent with no evidence of   aneurysmal changes. The abdominal aorta artery velocity pattern is within normal limits           Tech Comments 9/14   Right   The right iliac artery lumen appears patent with no evidence of aneurysmal   changes. Left   The left iliac artery lumen appears patent with no evidence of aneurysmal   changes. UnilateralThe aorta internal lumen appears patent with no evidence of   aneurysmal changes. The abdominal aorta artery velocity pattern is within normal limits        Right  8/14   The right internal carotid artery appears to have a 1-15% diameter reducing   stenosis based on velocity criteria. The right vertebral artery demonstrates normal antegrade flow. The right subclavian artery is visualized and demonstrates multiphasic flow. There is tortuosity of the right proximal internal carotid artery that may   count for the upper limits velocity in the absent of plaque. Left   The left internal carotid artery appears to have a 16-49% diameter reducing   stenosis based on velocity criteria. The left vertebral artery demonstrates normal antegrade flow. The left subclavian artery is visualized and demonstrates multiphasic flow.        States had elevated liver enzymes on crestor    PLAN:  Stop metoprolpl  Change to bystolic 10 mg, take 5 mg in AM and if

## 2018-03-14 NOTE — LETTER
stenosis based on velocity criteria. The left vertebral artery demonstrates normal antegrade flow. The left subclavian artery is visualized and demonstrates multiphasic flow. States had elevated liver enzymes on crestor    PLAN:    Continue bystolic 10 mg a day, no other changes, continue to monitor BP at home            [address]    If you have questions, please do not hesitate to call me. I look forward to following Izabella Rodriguez along with you.     Sincerely,        Mynor Eden NP

## 2018-03-14 NOTE — PROGRESS NOTES
regular rate and rhythm with normal S1, S2, 1/6 soft murmur,  · The carotid upstroke is normal, + bruit noted   · JVP is not elevated  · Peripheral pulses are symmetrical  · There is no clubbing, cyanosis of the extremities  · No edema  · Femoral Arteries: 2+ and equal  · Pedal Pulses: 2+ and equal   Abdomen:  · No masses or tenderness  · Normal bowel sounds  Neurological/Psychiatric:  · Alert and oriented x3  · Moves all extremities well  · Exhibits normal gait balance and coordination      Assessment/Plan  1. 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 Essential hypertension -   Previous RA stent 2003 by Dr. Bienvenido Bain at [facility]. only taking hydralazine once a day, needs to increase to BID  Renal arteries - patent stent in the left RA; 50% stenosis at the origin of the right renal artery. Plan: she can not tolerate bigger dose of clonidine  Summary 5/15      Grayscale imaging reveals normal-sized renal parenchyma and no significant   flow disturbance in the bilateral renal arteries. No hemodynamically significant stenosis in the visualized portion of the   renal arteries. Renal artery velocity, renal aortic ratio and resistive indices are noted to   be normal bilaterally. 3. Coronary atherosclerosis of native coronary artery -  CATH 8/17 in [facility]-no intervention, small vessel disease, patent stents  PCI to RCA 7/17 after abnormal GXT  GXT 5/26/11: normal myocardial perfusion w/ EF 66%   Angiogram 4/3/08: normal LVEDP at 12mmHg, EF 55%, minimal coronary disease with 2 patent stents, one of which was in the RCA and the second in OM branch One is a CONNOR stent placed by Dr Liam Tucker in 2001. LVEDP - 16  3/14  LVgram - mild inferior hypokinesis with EF 55%  Cors patent stents in OM and RCA with  20% instent restenosis in both. Plan: continue medical therapy      4.  Short of breath -remains the same   5. Chest pain-using NTG few times a week  ? Try IMdur but does get HA with NTG, does not want to do this yet  Summary 5/15   Normal myocardial perfusion study. Normal LV function         6. AAA 2006-  9/14   Tech Comments   Right   The right iliac artery lumen appears patent with no evidence of aneurysmal   changes. Left   The left iliac artery lumen appears patent with no evidence of aneurysmal   changes. UnilateralThe aorta internal lumen appears patent with no evidence of   aneurysmal changes. The abdominal aorta artery velocity pattern is within normal limits           Tech Comments 9/14   Right   The right iliac artery lumen appears patent with no evidence of aneurysmal   changes. Left   The left iliac artery lumen appears patent with no evidence of aneurysmal   changes. UnilateralThe aorta internal lumen appears patent with no evidence of   aneurysmal changes. The abdominal aorta artery velocity pattern is within normal limits        Right  8/14   The right internal carotid artery appears to have a 1-15% diameter reducing   stenosis based on velocity criteria. The right vertebral artery demonstrates normal antegrade flow. The right subclavian artery is visualized and demonstrates multiphasic flow. There is tortuosity of the right proximal internal carotid artery that may   count for the upper limits velocity in the absent of plaque. Left   The left internal carotid artery appears to have a 16-49% diameter reducing   stenosis based on velocity criteria. The left vertebral artery demonstrates normal antegrade flow. The left subclavian artery is visualized and demonstrates multiphasic flow.        States had elevated liver enzymes on crestor    PLAN:    Continue bystolic 10 mg a day, no other changes, continue to monitor BP at home            ROSEANNA Mcclellan [address]

## 2018-03-14 NOTE — COMMUNICATION BODY
[address]   Cardiac Evaluation      Patient: Esteban Skinner  YOB: 1955  Date: 3/14/18       Chief Complaint   Patient presents with    Coronary Artery Disease    Hyperlipidemia    Hypertension        Referring provider: Miriam Nelson MD    History of Present Illness:  Ms Nany Perez is seen today for  follow up. She has hx of elevated lipids,  HTN, CAD. She moved to [place] for few months ago  and had stent in LAD then. After abnormal GXT, and also found to have diffuse small vessel disease. She also has PVD. Lovena Prader had a stent placed to the RCA and OM in 2003. Then, August, 2009 she had a repeat cath which revealed patent stents. She is taking multiple blood pressure medications. She has a left renal artery stent in 2003 with Dr John Macdonald. SHe was on juxtapid for awhile, but insurance won't pay for it and now on repatha but the funds have run out and not interested in anymore. She continues to smoke 3 cigs a day. Lopressor was stopped and changed to bystolic 10 mg ad ay and BPS at home have been in the 130-140     Past Medical History:   has a past medical history of CAD (coronary artery disease); Hyperlipidemia; Hypertension; and PVD (peripheral vascular disease) ([facility] [address] Surgical History:   has a past surgical history that includes Coronary artery bypass graft; Hysterectomy; Abdominal aortic aneurysm repair (2007); and Cardiac catheterization. Social History:  Social History     Social History    Marital status:      Spouse name: N/A    Number of children: N/A    Years of education: N/A     Occupational History    Not on file.      Social History Main Topics    Smoking status: Former Smoker     Packs/day: 0.25     Years: 20.00    Smokeless tobacco: Never Used    Alcohol use No    Drug use: No    Sexual activity: Not on file     Other Topics Concern    Not on file     Social History Narrative    No narrative on file       Family History:  family history includes Coronary Art Dis in her father; Heart Disease in her father and mother; [employer] in her mother. Allergies:  Codeine; Penicillins; Sulfa antibiotics; and Versed [midazolam]     Review of Systems:   · Constitutional: there has been no unanticipated weight loss. · Eyes: No visual changes   · ENT: No Headaches, hearing loss or vertigo. No mouth sores or sore throat. · Cardiovascular: Reviewed in HPI  · Respiratory: No cough or wheezing, no sputum production. · Gastrointestinal: No abdominal pain, appetite loss, blood in stools. No change in bowel or bladder habits. · Genitourinary: No nocturia, dysuria, trouble voiding  · Musculoskeletal:  No gait disturbance, weakness or joint complaints. · Integumentary: No rash or pruritis. · Neurological: No headache, change in muscle strength, numbness or tingling. No change in gait, balance, coordination, mood, affect, memory, mentation, behavior. · Psychiatric: No anxiety or depression  · Endocrine: No malaise or fever  · Hematologic/Lymphatic: No abnormal bruising or bleeding, blood clots or swollen lymph nodes. · Allergic/Immunologic: No nasal congestion or hives. Physical Examination:    Vitals:    03/14/18 1343 03/14/18 1346   BP: (!) 150/90 (!) 152/90   Site: Left Arm    Position: Sitting    Cuff Size: Large Adult    Pulse: 70    Weight: 217 lb (98.4 kg)    Height: 5' (1.524 m)      Body mass index is 42.38 kg/m².      Wt Readings from Last 3 Encounters:   03/14/18 217 lb (98.4 kg)   02/28/18 214 lb (97.1 kg)   10/25/17 208 lb (94.3 kg)      BP Readings from Last 3 Encounters:   03/14/18 (!) 152/90   02/28/18 (!) 140/90   10/25/17 (!) 140/100      Constitutional and General Appearance:  appears stated age, has xanthomas, and xanthelasmas  Respiratory:  · Normal excursion and expansion without use of accessory muscles  · Resp Auscultation: Normal breath sounds without dullness  Cardiovascular:  · The apical impulses not displaced  · Heart is regular rate and rhythm with normal S1, S2, 1/6 soft murmur,  · The carotid upstroke is normal, + bruit noted   · JVP is not elevated  · Peripheral pulses are symmetrical  · There is no clubbing, cyanosis of the extremities  · No edema  · Femoral Arteries: 2+ and equal  · Pedal Pulses: 2+ and equal   Abdomen:  · No masses or tenderness  · Normal bowel sounds  Neurological/Psychiatric:  · Alert and oriented x3  · Moves all extremities well  · Exhibits normal gait balance and coordination      Assessment/Plan  1. 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 Essential hypertension -   Previous RA stent 2003 by Dr. Celio Schmidt at [facility]. only taking hydralazine once a day, needs to increase to BID  Renal arteries - patent stent in the left RA; 50% stenosis at the origin of the right renal artery. Plan: she can not tolerate bigger dose of clonidine  Summary 5/15      Grayscale imaging reveals normal-sized renal parenchyma and no significant   flow disturbance in the bilateral renal arteries. No hemodynamically significant stenosis in the visualized portion of the   renal arteries. Renal artery velocity, renal aortic ratio and resistive indices are noted to   be normal bilaterally. 3. Coronary atherosclerosis of native coronary artery -  CATH 8/17 in [facility]-no intervention, small vessel disease, patent stents  PCI to RCA 7/17 after abnormal GXT  GXT 5/26/11: normal myocardial perfusion w/ EF 66%   Angiogram 4/3/08: normal LVEDP at 12mmHg, EF 55%, minimal coronary disease with 2 patent stents, one of which was in the RCA and the second in OM branch One is a CONNOR stent placed by Dr Emilie Ambrose in 2001. LVEDP - 16  3/14  LVgram - mild inferior hypokinesis with EF 55%  Cors patent stents in OM and RCA with  20% instent restenosis in both. Plan: continue medical therapy      4.  Short of breath

## 2018-08-15 ENCOUNTER — OFFICE VISIT (OUTPATIENT)
Dept: CARDIOLOGY CLINIC | Age: 63
End: 2018-08-15

## 2018-08-15 VITALS
HEIGHT: 60 IN | SYSTOLIC BLOOD PRESSURE: 130 MMHG | WEIGHT: 220 LBS | BODY MASS INDEX: 43.19 KG/M2 | DIASTOLIC BLOOD PRESSURE: 80 MMHG | HEART RATE: 78 BPM

## 2018-08-15 DIAGNOSIS — E78.2 MIXED HYPERLIPIDEMIA: ICD-10-CM

## 2018-08-15 DIAGNOSIS — I25.119 ATHEROSCLEROSIS OF NATIVE CORONARY ARTERY OF NATIVE HEART WITH ANGINA PECTORIS (HCC): ICD-10-CM

## 2018-08-15 DIAGNOSIS — I65.23 BILATERAL CAROTID ARTERY STENOSIS: ICD-10-CM

## 2018-08-15 DIAGNOSIS — R07.9 CHEST PAIN, UNSPECIFIED TYPE: Primary | ICD-10-CM

## 2018-08-15 PROCEDURE — 93000 ELECTROCARDIOGRAM COMPLETE: CPT | Performed by: NURSE PRACTITIONER

## 2018-08-15 PROCEDURE — 99214 OFFICE O/P EST MOD 30 MIN: CPT | Performed by: NURSE PRACTITIONER

## 2018-08-15 RX ORDER — ISOSORBIDE MONONITRATE 30 MG/1
30 TABLET, EXTENDED RELEASE ORAL DAILY
Qty: 30 TABLET | Refills: 3 | Status: SHIPPED | OUTPATIENT
Start: 2018-08-15 | End: 2019-11-06

## 2018-08-15 NOTE — LETTER
 Number of children: N/A    Years of education: N/A     Occupational History    Not on file. Social History Main Topics    Smoking status: Former Smoker     Packs/day: 0.25     Years: 20.00    Smokeless tobacco: Never Used    Alcohol use No    Drug use: No    Sexual activity: Not on file     Other Topics Concern    Not on file     Social History Narrative    No narrative on file       Family History:  family history includes Coronary Art Dis in her father; Heart Disease in her father and mother; Irma Masood in her mother. Allergies:  Codeine; Penicillins; Sulfa antibiotics; and Versed [midazolam]     Review of Systems:   · Constitutional: there has been no unanticipated weight loss. · Eyes: No visual changes   · ENT: No Headaches, hearing loss or vertigo. No mouth sores or sore throat. · Cardiovascular: Reviewed in HPI  · Respiratory: No cough or wheezing, no sputum production. · Gastrointestinal: No abdominal pain, appetite loss, blood in stools. No change in bowel or bladder habits. · Genitourinary: No nocturia, dysuria, trouble voiding  · Musculoskeletal:  No gait disturbance, weakness or joint complaints. · Integumentary: No rash or pruritis. · Neurological: No headache, change in muscle strength, numbness or tingling. No change in gait, balance, coordination, mood, affect, memory, mentation, behavior. · Psychiatric: No anxiety or depression  · Endocrine: No malaise or fever  · Hematologic/Lymphatic: No abnormal bruising or bleeding, blood clots or swollen lymph nodes. · Allergic/Immunologic: No nasal congestion or hives. Physical Examination:    Vitals:    08/14/18 1633   BP: 130/80   Pulse: 78   Weight: 220 lb (99.8 kg)   Height: 5' (1.524 m)     Body mass index is 42.97 kg/m².      Wt Readings from Last 3 Encounters:   08/14/18 220 lb (99.8 kg)   03/14/18 217 lb (98.4 kg)   02/28/18 214 lb (97.1 kg)      BP Readings from Last 3 Encounters:   08/14/18 130/80   03/14/18 (!) 152/90 The left subclavian artery is visualized and demonstrates multiphasic flow. States had elevated liver enzymes on crestor    PLAN:    Add Imdur 30 mg a day  Needs lexiscan GXT ( can't walk on TM)  Carotid US (has known disease) needs [address]    If you have questions, please do not hesitate to call me. I look forward to following Kimberly Plata along with you.     Sincerely,        Carol Neal, MARCIE - CNP

## 2018-08-15 NOTE — COMMUNICATION BODY
[address]   Cardiac Evaluation      Patient: Yash Herman  YOB: 1955  Date: 8/15/18       Chief Complaint   Patient presents with    Coronary Artery Disease     6 month    Hypertension    Hyperlipidemia        Referring provider: Cole Escobar MD    History of Present Illness:  Ms Mark Mckay is seen today for  follow up. She has hx of elevated lipids,  HTN, CAD. She moved to [place] for few months ago  and had stent in LAD the-2015  After abnormal GXT, and also found to have diffuse small vessel disease. She also has PVD. Benedicto Martinez had a stent placed to the RCA and OM in 2003. Then, August, 2009 she had a repeat cath which revealed patent stents. She is taking multiple blood pressure medications. She has a left renal artery stent in 2003 with Dr Jazlyn Portillo. SHe was on juxtapid for awhile, but insurance won't pay for it and now on repatha but the funds have run out and not interested in anymore. She continues to smoke 3 cigs a day. Lopressor was stopped and changed to bystolic 10 mg ad ay and BPS at home have been in the 130-140     Past Medical History:   has a past medical history of CAD (coronary artery disease); Hyperlipidemia; Hypertension; and PVD (peripheral vascular disease) ([facility] [address] Surgical History:   has a past surgical history that includes Coronary artery bypass graft; Hysterectomy; Abdominal aortic aneurysm repair (2007); and Cardiac catheterization. Social History:  Social History     Social History    Marital status:      Spouse name: N/A    Number of children: N/A    Years of education: N/A     Occupational History    Not on file.      Social History Main Topics    Smoking status: Former Smoker     Packs/day: 0.25     Years: 20.00    Smokeless tobacco: Never Used    Alcohol use No    Drug use: No    Sexual activity: Not on file     Other Topics Concern    Not on file     Social History Narrative    No narrative on file       Family lexiscan  Summary 5/15   Normal myocardial perfusion study. Normal LV function         6. AAA 2006-  9/14   Tech Comments   Right   The right iliac artery lumen appears patent with no evidence of aneurysmal   changes. Left   The left iliac artery lumen appears patent with no evidence of aneurysmal   changes. UnilateralThe aorta internal lumen appears patent with no evidence of   aneurysmal changes. The abdominal aorta artery velocity pattern is within normal limits           Tech Comments 9/14   Right   The right iliac artery lumen appears patent with no evidence of aneurysmal   changes. Left   The left iliac artery lumen appears patent with no evidence of aneurysmal   changes. UnilateralThe aorta internal lumen appears patent with no evidence of   aneurysmal changes. The abdominal aorta artery velocity pattern is within normal limits        Right  8/14   The right internal carotid artery appears to have a 1-15% diameter reducing   stenosis based on velocity criteria. The right vertebral artery demonstrates normal antegrade flow. The right subclavian artery is visualized and demonstrates multiphasic flow. There is tortuosity of the right proximal internal carotid artery that may   count for the upper limits velocity in the absent of plaque. Left   The left internal carotid artery appears to have a 16-49% diameter reducing   stenosis based on velocity criteria. The left vertebral artery demonstrates normal antegrade flow. The left subclavian artery is visualized and demonstrates multiphasic flow.        States had elevated liver enzymes on crestor    PLAN:    Add Imdur 30 mg a day  Needs lexiscan GXT ( can't walk on TM)  Carotid US (has known disease) needs [facility]

## 2018-08-20 ENCOUNTER — TELEPHONE (OUTPATIENT)
Dept: CARDIOLOGY CLINIC | Age: 63
End: 2018-08-20

## 2018-08-20 ENCOUNTER — HOSPITAL ENCOUNTER (OUTPATIENT)
Dept: NON INVASIVE DIAGNOSTICS | Age: 63
Discharge: HOME | End: 2018-08-20
Attending: NURSE PRACTITIONER | Admitting: NURSE PRACTITIONER

## 2018-08-20 DIAGNOSIS — I65.23 BILATERAL CAROTID ARTERY STENOSIS: Primary | ICD-10-CM

## 2018-08-20 DIAGNOSIS — I65.23 OCCLUSION AND STENOSIS OF BILATERAL CAROTID ARTERIES: ICD-10-CM

## 2018-08-20 PROBLEM — I20.0 UNSTABLE ANGINA (HCC): Status: ACTIVE | Noted: 2018-08-20

## 2018-09-20 VITALS — BODY MASS INDEX: 43.62 KG/M2 | HEIGHT: 60 IN

## 2018-09-21 ENCOUNTER — OFFICE VISIT (OUTPATIENT)
Dept: CARDIOLOGY CLINIC | Age: 63
End: 2018-09-21

## 2018-09-21 DIAGNOSIS — I25.10 CORONARY ARTERY DISEASE INVOLVING NATIVE CORONARY ARTERY OF NATIVE HEART WITHOUT ANGINA PECTORIS: Primary | ICD-10-CM

## 2018-09-26 ENCOUNTER — OFFICE VISIT (OUTPATIENT)
Dept: CARDIOLOGY CLINIC | Age: 63
End: 2018-09-26
Payer: COMMERCIAL

## 2018-09-26 VITALS
DIASTOLIC BLOOD PRESSURE: 84 MMHG | OXYGEN SATURATION: 96 % | HEART RATE: 88 BPM | BODY MASS INDEX: 43.19 KG/M2 | SYSTOLIC BLOOD PRESSURE: 130 MMHG | WEIGHT: 220 LBS | HEIGHT: 60 IN

## 2018-09-26 DIAGNOSIS — I10 ESSENTIAL HYPERTENSION, BENIGN: ICD-10-CM

## 2018-09-26 DIAGNOSIS — R53.83 FATIGUE, UNSPECIFIED TYPE: Primary | ICD-10-CM

## 2018-09-26 DIAGNOSIS — I25.119 ATHEROSCLEROSIS OF NATIVE CORONARY ARTERY OF NATIVE HEART WITH ANGINA PECTORIS (HCC): ICD-10-CM

## 2018-09-26 PROCEDURE — 99214 OFFICE O/P EST MOD 30 MIN: CPT | Performed by: NURSE PRACTITIONER

## 2018-09-26 NOTE — PROGRESS NOTES
rhythm with normal S1, S2, 1/6 soft murmur,  · The carotid upstroke is normal, + bruit noted   · JVP is not elevated  · Peripheral pulses are symmetrical  · There is no clubbing, cyanosis of the extremities  · No edema  · Femoral Arteries: 2+ and equal  · Pedal Pulses: 2+ and equal   Abdomen:  · No masses or tenderness  · Normal bowel sounds  Neurological/Psychiatric:  · Alert and oriented x3  · Moves all extremities well  · Exhibits normal gait balance and coordination      Assessment/Plan  1. 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 Essential hypertension -   Previous RA stent 2003 by Dr. Tami Robertson [facility]. only taking hydralazine once a day, needs to increase to BID  Renal arteries - patent stent in the left RA; 50% stenosis at the origin of the right renal artery. Plan: she can not tolerate bigger dose of clonidine  Summary 5/15      Grayscale imaging reveals normal-sized renal parenchyma and no significant   flow disturbance in the bilateral renal arteries. No hemodynamically significant stenosis in the visualized portion of the   renal arteries. Renal artery velocity, renal aortic ratio and resistive indices are noted to   be normal bilaterally. 3. Coronary atherosclerosis of native coronary artery -  8/18      Summary    -Normal myocardial perfusion.    -LV function is mildly reduced with global hypokinesis and ejection fraction    of 49%. CATH 8/17 in [place]-no intervention, small vessel disease, patent stents  PCI to RCA 7/17 after abnormal GXT  GXT 5/26/11: normal myocardial perfusion w/ EF 66%   Angiogram 4/3/08: normal LVEDP at 12mmHg, EF 55%, minimal coronary disease with 2 patent stents, one of which was in the RCA and the second in OM branch One is a CONNOR stent placed by Dr Debo Connolly in 2001.  LVEDP - 16  3/14  LVgram - mild inferior hypokinesis with EF 55%  Cors patent

## 2019-01-28 VITALS — BODY MASS INDEX: 42.97 KG/M2 | HEIGHT: 60 IN

## 2019-01-30 ENCOUNTER — OFFICE VISIT (OUTPATIENT)
Dept: CARDIOLOGY CLINIC | Age: 64
End: 2019-01-30
Payer: COMMERCIAL

## 2019-01-30 ENCOUNTER — OFFICE VISIT (OUTPATIENT)
Dept: CARDIOLOGY CLINIC | Age: 64
End: 2019-01-30

## 2019-01-30 VITALS
SYSTOLIC BLOOD PRESSURE: 180 MMHG | HEIGHT: 60 IN | HEART RATE: 100 BPM | WEIGHT: 224 LBS | BODY MASS INDEX: 43.98 KG/M2 | DIASTOLIC BLOOD PRESSURE: 120 MMHG

## 2019-01-30 DIAGNOSIS — I10 ESSENTIAL HYPERTENSION, BENIGN: ICD-10-CM

## 2019-01-30 DIAGNOSIS — I25.119 ATHEROSCLEROSIS OF NATIVE CORONARY ARTERY OF NATIVE HEART WITH ANGINA PECTORIS (HCC): Primary | ICD-10-CM

## 2019-01-30 DIAGNOSIS — E78.2 MIXED HYPERLIPIDEMIA: ICD-10-CM

## 2019-01-30 PROCEDURE — 99214 OFFICE O/P EST MOD 30 MIN: CPT | Performed by: NURSE PRACTITIONER

## 2019-02-05 ENCOUNTER — TELEPHONE (OUTPATIENT)
Dept: CARDIOLOGY CLINIC | Age: 64
End: 2019-02-05

## 2019-05-01 ENCOUNTER — OFFICE VISIT (OUTPATIENT)
Dept: CARDIOLOGY CLINIC | Age: 64
End: 2019-05-01
Payer: COMMERCIAL

## 2019-05-01 VITALS
HEART RATE: 84 BPM | DIASTOLIC BLOOD PRESSURE: 104 MMHG | BODY MASS INDEX: 43.39 KG/M2 | WEIGHT: 221 LBS | SYSTOLIC BLOOD PRESSURE: 160 MMHG | OXYGEN SATURATION: 97 % | HEIGHT: 60 IN

## 2019-05-01 DIAGNOSIS — R07.9 CHEST PAIN, UNSPECIFIED TYPE: Primary | ICD-10-CM

## 2019-05-01 DIAGNOSIS — I25.119 ATHEROSCLEROSIS OF NATIVE CORONARY ARTERY OF NATIVE HEART WITH ANGINA PECTORIS (HCC): ICD-10-CM

## 2019-05-01 DIAGNOSIS — E78.2 MIXED HYPERLIPIDEMIA: ICD-10-CM

## 2019-05-01 PROCEDURE — 99214 OFFICE O/P EST MOD 30 MIN: CPT | Performed by: NURSE PRACTITIONER

## 2019-05-01 NOTE — LETTER
Hysterectomy; Abdominal aortic aneurysm repair (); and Cardiac catheterization. Social History:  Social History     Socioeconomic History    Marital status:      Spouse name: Not on file    Number of children: Not on file    Years of education: Not on file    Highest education level: Not on file   Occupational History    Not on file   Social Needs    Financial resource strain: Not on file    Food insecurity:     Worry: Not on file     Inability: Not on file    Transportation needs:     Medical: Not on file     Non-medical: Not on file   Tobacco Use    Smoking status: Former Smoker     Packs/day: 0.25     Years: 20.00     Pack years: 5.00     Last attempt to quit: 10/5/2017     Years since quittin.5    Smokeless tobacco: Never Used   Substance and Sexual Activity    Alcohol use: No    Drug use: No    Sexual activity: Not on file   Lifestyle    Physical activity:     Days per week: Not on file     Minutes per session: Not on file    Stress: Not on file   Relationships    Social connections:     Talks on phone: Not on file     Gets together: Not on file     Attends [employer] service: Not on file     Active member of club or organization: Not on file     Attends meetings of clubs or organizations: Not on file     Relationship status: Not on file    Intimate partner violence:     Fear of current or ex partner: Not on file     Emotionally abused: Not on file     Physically abused: Not on file     Forced sexual activity: Not on file   Other Topics Concern    Not on file   Social History Narrative    Not on file       Family History:  family history includes Coronary Art Dis in her father; Heart Disease in her father and mother; Carlos Gregory in her mother. Allergies:  Penicillins; Codeine; Sulfa antibiotics; and Versed [midazolam]     Review of Systems:   · Constitutional: there has been no unanticipated weight loss.   · Eyes: No visual changes · ENT: No Headaches, hearing loss or vertigo. No mouth sores or sore throat. · Cardiovascular: Reviewed in HPI  · Respiratory: No cough or wheezing, no sputum production. · Gastrointestinal: No abdominal pain, appetite loss, blood in stools. No change in bowel or bladder habits. · Genitourinary: No nocturia, dysuria, trouble voiding  · Musculoskeletal:  No gait disturbance, weakness or joint complaints. · Integumentary: No rash or pruritis. · Neurological: No headache, change in muscle strength, numbness or tingling. No change in gait, balance, coordination, mood, affect, memory, mentation, behavior. · Psychiatric: No anxiety or depression  · Endocrine: No malaise or fever  · Hematologic/Lymphatic: No abnormal bruising or bleeding, blood clots or swollen lymph nodes. · Allergic/Immunologic: No nasal congestion or hives. Physical Examination:    Vitals:    05/01/19 1352 05/01/19 1355   BP: 130/84 (!) 160/104   Site: Left Upper Arm Right Upper Arm   Position: Sitting Sitting   Cuff Size: Large Adult Large Adult   Pulse: 84    SpO2: 97%    Weight: 221 lb (100.2 kg)    Height: 5' (1.524 m)      Body mass index is 43.16 kg/m².      Wt Readings from Last 3 Encounters:   05/01/19 221 lb (100.2 kg)   01/30/19 224 lb (101.6 kg)   09/26/18 220 lb (99.8 kg)      BP Readings from Last 3 Encounters:   05/01/19 (!) 160/104   01/30/19 (!) 180/120   09/26/18 130/84      Constitutional and General Appearance:  appears stated age, has xanthomas, and xanthelasmas  Respiratory:  · Normal excursion and expansion without use of accessory muscles  · Resp Auscultation: Normal breath sounds without dullness  Cardiovascular:  · The apical impulses not displaced  · Heart is regular rate and rhythm with normal S1, S2, 1/6 soft murmur,  · The carotid upstroke is normal, + bruit noted   · JVP is not elevated  · Peripheral pulses are symmetrical  · There is no clubbing, cyanosis of the extremities  · No edema PCI to RCA 7/17 after abnormal GXT  GXT 5/26/11: normal myocardial perfusion w/ EF 66%   Angiogram 4/3/08: normal LVEDP at 12mmHg, EF 55%, minimal coronary disease with 2 patent stents, one of which was in the RCA and the second in OM branch One is a CONNOR stent placed by Dr Ryan Ramirez in 2001. LVEDP - 16  3/14  LVgram - mild inferior hypokinesis with EF 55%  Cors patent stents in OM and RCA with  20% instent restenosis in both. Plan: continue medical therapy      4. Short of breath -remains the same   5. Chest pain-improved    Summary 5/15   Normal myocardial perfusion study. Normal LV function         6. AAA 2006-  9/14   Tech Comments   Right   The right iliac artery lumen appears patent with no evidence of aneurysmal   changes. Left   The left iliac artery lumen appears patent with no evidence of aneurysmal   changes. UnilateralThe aorta internal lumen appears patent with no evidence of   aneurysmal changes. The abdominal aorta artery velocity pattern is within normal limits           Tech Comments 9/14   Right   The right iliac artery lumen appears patent with no evidence of aneurysmal   changes. Left   The left iliac artery lumen appears patent with no evidence of aneurysmal   changes. UnilateralThe aorta internal lumen appears patent with no evidence of   aneurysmal changes. The abdominal aorta artery velocity pattern is within normal limits        Right  8/14   The right internal carotid artery appears to have a 1-15% diameter reducing   stenosis based on velocity criteria. The right vertebral artery demonstrates normal antegrade flow. The right subclavian artery is visualized and demonstrates multiphasic flow. There is tortuosity of the right proximal internal carotid artery that may   count for the upper limits velocity in the absent of plaque. Left   The left internal carotid artery appears to have a 16-49% diameter reducing   stenosis based on velocity criteria.

## 2019-11-06 ENCOUNTER — OFFICE VISIT (OUTPATIENT)
Dept: CARDIOLOGY CLINIC | Age: 64
End: 2019-11-06
Payer: COMMERCIAL

## 2019-11-06 VITALS
HEIGHT: 60 IN | HEART RATE: 78 BPM | DIASTOLIC BLOOD PRESSURE: 70 MMHG | WEIGHT: 216 LBS | SYSTOLIC BLOOD PRESSURE: 120 MMHG | OXYGEN SATURATION: 98 % | BODY MASS INDEX: 42.41 KG/M2

## 2019-11-06 DIAGNOSIS — I48.91 ATRIAL FIBRILLATION, UNSPECIFIED TYPE (HCC): Primary | ICD-10-CM

## 2019-11-06 DIAGNOSIS — I10 ESSENTIAL HYPERTENSION, BENIGN: ICD-10-CM

## 2019-11-06 DIAGNOSIS — I25.119 ATHEROSCLEROSIS OF NATIVE CORONARY ARTERY OF NATIVE HEART WITH ANGINA PECTORIS (HCC): ICD-10-CM

## 2019-11-06 DIAGNOSIS — E78.2 MIXED HYPERLIPIDEMIA: ICD-10-CM

## 2019-11-06 PROCEDURE — 99214 OFFICE O/P EST MOD 30 MIN: CPT | Performed by: NURSE PRACTITIONER

## 2019-11-06 PROCEDURE — 93000 ELECTROCARDIOGRAM COMPLETE: CPT | Performed by: NURSE PRACTITIONER

## 2019-11-06 RX ORDER — ISOSORBIDE MONONITRATE 60 MG/1
60 TABLET, EXTENDED RELEASE ORAL DAILY
Qty: 30 TABLET | Refills: 5 | Status: SHIPPED | OUTPATIENT
Start: 2019-11-06

## 2019-11-06 RX ORDER — NITROGLYCERIN 400 UG/1
1 SPRAY ORAL EVERY 5 MIN PRN
Qty: 1 BOTTLE | Refills: 2 | Status: SHIPPED | OUTPATIENT
Start: 2019-11-06

## 2020-07-30 ENCOUNTER — TELEPHONE (OUTPATIENT)
Dept: CARDIOLOGY CLINIC | Age: 65
End: 2020-07-30